# Patient Record
Sex: MALE | Race: WHITE | NOT HISPANIC OR LATINO | Employment: FULL TIME | URBAN - METROPOLITAN AREA
[De-identification: names, ages, dates, MRNs, and addresses within clinical notes are randomized per-mention and may not be internally consistent; named-entity substitution may affect disease eponyms.]

---

## 2020-06-20 ENCOUNTER — HOSPITAL ENCOUNTER (OUTPATIENT)
Dept: RADIOLOGY | Facility: HOSPITAL | Age: 35
Discharge: HOME | End: 2020-06-20

## 2020-06-20 ENCOUNTER — OFFICE VISIT - HEALTHEAST (OUTPATIENT)
Dept: FAMILY MEDICINE | Facility: CLINIC | Age: 35
End: 2020-06-20

## 2020-06-20 DIAGNOSIS — M25.531 RIGHT WRIST PAIN: ICD-10-CM

## 2020-06-20 DIAGNOSIS — M65.4 DE QUERVAIN'S DISEASE (TENOSYNOVITIS): ICD-10-CM

## 2020-07-07 ENCOUNTER — OFFICE VISIT - HEALTHEAST (OUTPATIENT)
Dept: INTERNAL MEDICINE | Facility: CLINIC | Age: 35
End: 2020-07-07

## 2020-07-07 DIAGNOSIS — M65.4 DE QUERVAIN'S DISEASE (TENOSYNOVITIS): ICD-10-CM

## 2020-07-07 DIAGNOSIS — M25.531 RIGHT WRIST PAIN: ICD-10-CM

## 2020-07-16 ENCOUNTER — COMMUNICATION - HEALTHEAST (OUTPATIENT)
Dept: INTERNAL MEDICINE | Facility: CLINIC | Age: 35
End: 2020-07-16

## 2021-02-26 ENCOUNTER — OFFICE VISIT - HEALTHEAST (OUTPATIENT)
Dept: FAMILY MEDICINE | Facility: CLINIC | Age: 36
End: 2021-02-26

## 2021-02-26 DIAGNOSIS — M54.50 ACUTE RIGHT-SIDED LOW BACK PAIN WITHOUT SCIATICA: ICD-10-CM

## 2021-03-04 ENCOUNTER — OFFICE VISIT - HEALTHEAST (OUTPATIENT)
Dept: INTERNAL MEDICINE | Facility: CLINIC | Age: 36
End: 2021-03-04

## 2021-03-04 DIAGNOSIS — M54.41 ACUTE BILATERAL LOW BACK PAIN WITH RIGHT-SIDED SCIATICA: ICD-10-CM

## 2021-03-04 ASSESSMENT — MIFFLIN-ST. JEOR: SCORE: 1705.57

## 2021-03-17 ENCOUNTER — COMMUNICATION - HEALTHEAST (OUTPATIENT)
Dept: INTERNAL MEDICINE | Facility: CLINIC | Age: 36
End: 2021-03-17

## 2021-06-04 VITALS
OXYGEN SATURATION: 98 % | HEART RATE: 62 BPM | SYSTOLIC BLOOD PRESSURE: 96 MMHG | RESPIRATION RATE: 14 BRPM | TEMPERATURE: 98.3 F | WEIGHT: 171 LBS | DIASTOLIC BLOOD PRESSURE: 64 MMHG

## 2021-06-04 VITALS
SYSTOLIC BLOOD PRESSURE: 109 MMHG | OXYGEN SATURATION: 98 % | DIASTOLIC BLOOD PRESSURE: 71 MMHG | RESPIRATION RATE: 16 BRPM | WEIGHT: 169 LBS | HEART RATE: 83 BPM

## 2021-06-05 VITALS
HEART RATE: 66 BPM | BODY MASS INDEX: 28.09 KG/M2 | WEIGHT: 179 LBS | HEIGHT: 67 IN | DIASTOLIC BLOOD PRESSURE: 64 MMHG | TEMPERATURE: 96.6 F | OXYGEN SATURATION: 100 % | RESPIRATION RATE: 22 BRPM | SYSTOLIC BLOOD PRESSURE: 110 MMHG

## 2021-06-05 VITALS
SYSTOLIC BLOOD PRESSURE: 121 MMHG | OXYGEN SATURATION: 99 % | DIASTOLIC BLOOD PRESSURE: 68 MMHG | TEMPERATURE: 97.6 F | WEIGHT: 178.1 LBS | RESPIRATION RATE: 12 BRPM | HEART RATE: 57 BPM

## 2021-06-09 NOTE — TELEPHONE ENCOUNTER
Who is calling:  Karime with [employer]  Reason for Call:    Karime is questioning who the patient was referred to in Orthopedics.  Please reach out to Karime and juan daniel.  Date of last appointment with primary care: 7/7/2020  Okay to leave a detailed message: Yes

## 2021-06-09 NOTE — PROGRESS NOTES
Internal Medicine Office Visit  [facility]  Patient Name: Damien Canela  Patient Age: 34 y.o.  YOB: 1985  MRN: [id number]    Date of Visit: 2020  Reason for Office Visit:   Chief Complaint   Patient presents with     Follow-up     thumb injury  was seen in [facility]           Assessment / Plan / Medical Decision Makin. De Quervain's disease (tenosynovitis)  2. Right wrist pain  - Ambulatory referral to Orthopedic Surgery  Recommend continued use of the splint, discouraged with activity just exacerbate his symptoms.  And be referred to orthopedics for further management possible steroid injection.  All patient questions rest verbalized understanding and agreed with plan.    Orders Placed This Encounter   Procedures     Ambulatory referral to Orthopedic Surgery   Followup: Return in about 1 week (around 2020). earlier if needed.    Health Maintenance Review  Health Maintenance   Topic Date Due     PREVENTIVE CARE VISIT  1985     HIV SCREENING  2000     TD 18+ HE  2003     TDAP ADULT ONE TIME DOSE  2003     ADVANCE CARE PLANNING  2003     INFLUENZA VACCINE RULE BASED (1) 2020         Damien Canela does not currently have medications on file.      HPI:  Damien Canela is a 34 y.o. year old who presents to the office today Patient presents clinic today for follow-up of right wrist injury.  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  He was seen in walk-in care was diagnosed as de Quervain's tenosynovitis advised to utilize the thumb spica splint, NSAIDs, ice and prescription for doing activities which exacerbate his symptoms.  Patient reports some improvement he stopped taking the ibuprofen and Tylenol as the pain is no longer throbbing though has  continued difficulty with grasping or opening his hand fully.        Review of Systems- pertinent positive in bold:  Constitutional: Fever, chills, night sweats, fainting, weight change, fatigue, dizziness, sleeping difficulties, loud snoring/pauses in breathing  Eyes: change in vision, blurred or double vision, redness/eye pain  Ears, nose, mouth, throat: change in hearing, ear pain, hoarseness, difficulty swallowing, sores in the mouth or throat  Respiratory: shortness of breath, cough, bloody sputum, wheezing  Cardiovascular: chest pain, palpitations   Gastrointestinal: abdominal pain, heartburn/indigestion, nausea/vomiting, change in appetite, change in bowel habits, constipation or diarrhea, rectal bleeding/dark stools, difficulty swallowing  Urinary: painful urination, frequent urination, urinary urgency/incontinence, blood in urine/dark urine, nocturia (new or increasing), muscle cramps, swelling of hands, feet, ankles, leg pain/redness  Skin: change in moles/freckles, rash, nodules  Hematologic/lymphatic: swollen lymph glands, abnormal bruising/bleeding  Endocrine: excessive thirst/urination, cold or heat intolerance  Neurologic/emotional: worrisome memory change, numbness/tingling, anxiety, mood swings      Current Scheduled Meds:  No outpatient encounter medications on file as of 7/7/2020.     No facility-administered encounter medications on file as of 7/7/2020.      No past medical history on file.  No past surgical history on file.  Social History     Tobacco Use     Smoking status: Never Smoker     Smokeless tobacco: Never Used   Substance Use Topics     Alcohol use: Not on file     Drug use: Not on file     No family history on file.  Social History     Social History Narrative     Not on file       Objective / Physical Examination:  Vitals:    07/07/20 1030   BP: 96/64   Patient Site: Right Arm   Patient Position: Sitting   Cuff Size: Adult Regular   Pulse: 62   Resp: 14   Temp: 98.3  F (36.8  C)    TempSrc: Oral   SpO2: 98%   Weight: 171 lb (77.6 kg)     Wt Readings from Last 3 Encounters:   07/07/20 171 lb (77.6 kg)   06/20/20 169 lb (76.7 kg)     There is no height or weight on file to calculate BMI.     General Appearance: Alert and oriented, cooperative, affect appropriate, speech clear, in no apparent distress  Head: Normocephalic, atraumatic  Eyes:  Conjunctivae clear and sclerae non-icteric  Lungs:  Normal inspiratory and expiratory effort  Abdomen: Bowel sounds active all four quadrants. Soft, non-tender. No hepatomegaly or splenomegaly.   Extremities: Pulses 2+ and equal throughout. No edema.  Patient has slight reduction in hand grasp right.  Patient noted to have tenderness at the base of the right thumb.  Integumentary: Warm and dry.   Neuro: Alert and oriented, follows commands appropriately.     Xr Wrist Right 3 Or More Vws    Result Date: 6/20/2020  EXAM: XR WRIST RIGHT 3 OR MORE VWS LOCATION: [facility] DATE/TIME: 6/20/2020 9:25 AM INDICATION: Pain below thumb near wrist, sudden, severe COMPARISON: None.     Negative right wrist. Normal joint spaces and alignment. No fracture.    No results found for this or any previous visit (from the past 240 hour(s)).        Leena Oakley, CNP

## 2021-06-15 NOTE — PATIENT INSTRUCTIONS - HE
Naprosyn 500 mg two times per day for 10 days.  Take with food.     Tylenol 500 mg three times per day as needed.    Heat and/or ice to area of pain three times per day.    Return to work on March 4, 2021 or sooner as able.    Follow up in clinic if not improving in one week.

## 2021-06-15 NOTE — PROGRESS NOTES
Chief Complaint   Patient presents with     Work Related Injury     back pain, injured today at 0900, lower Rt side buttock and pain travels down leg, sharp pinched pain       HPI:  Damien Canela is a 35 y.o. male who presents today complaining of r low back pain    History obtained from the patient.    Problem List:  There are no relevant problems documented for this patient.      No past medical history on file.    Social History     Tobacco Use     Smoking status: Never Smoker     Smokeless tobacco: Never Used   Substance Use Topics     Alcohol use: Not on file       Review of Systems   Constitutional: Negative.    HENT: Negative.    Eyes: Negative.    Respiratory: Negative.    Cardiovascular: Negative.    Gastrointestinal: Negative.    Endocrine: Negative.    Genitourinary: Negative.    Musculoskeletal: Positive for back pain. Negative for arthralgias, gait problem, joint swelling, myalgias, neck pain and neck stiffness.   Skin: Negative.    Allergic/Immunologic: Negative.    Neurological: Negative.    Hematological: Negative.    Psychiatric/Behavioral: Negative.        Vitals:    02/26/21 1110   BP: 121/68   Pulse: (!) 57   Resp: 12   Temp: 97.6  F (36.4  C)   TempSrc: Oral   SpO2: 99%   Weight: 178 lb 1.6 oz (80.8 kg)       Physical Exam  Constitutional:       Appearance: Normal appearance. He is normal weight.   HENT:      Head: Normocephalic and atraumatic.      Right Ear: Tympanic membrane, ear canal and external ear normal.      Left Ear: Tympanic membrane, ear canal and external ear normal.      Nose: Nose normal.      Mouth/Throat:      Mouth: Mucous membranes are dry.      Pharynx: Oropharynx is clear.   Eyes:      Extraocular Movements: Extraocular movements intact.      Conjunctiva/sclera: Conjunctivae normal.      Pupils: Pupils are equal, round, and reactive to light.   Neck:      Musculoskeletal: Normal range of motion and neck supple.   Cardiovascular:      Rate and Rhythm: Normal rate and regular  rhythm.      Pulses: Normal pulses.      Heart sounds: Normal heart sounds.   Pulmonary:      Effort: Pulmonary effort is normal.      Breath sounds: Normal breath sounds.   Abdominal:      General: Abdomen is flat. Bowel sounds are normal.      Palpations: Abdomen is soft.   Musculoskeletal:         General: Tenderness present. No swelling, deformity or signs of injury.      Right lower leg: No edema.      Left lower leg: No edema.      Comments: LOW BACK - neg slr bilaterally, nl strength, sensation and reflexes in the bilateral lower extremities.  No ttp trochanteric bursa.  Ttp r low back paraspinal muscles and r buttock.  No rash.   Normal gait. No pelvic tilt.  Normal range of motion of back.  Discomfort in r low back and r buttock on extending and flexing knee and hip.  Nl internal and external rotation of hips.   Skin:     General: Skin is warm and dry.      Capillary Refill: Capillary refill takes less than 2 seconds.   Neurological:      General: No focal deficit present.      Mental Status: He is alert. Mental status is at baseline. He is disoriented.      Cranial Nerves: No cranial nerve deficit.      Sensory: No sensory deficit.      Motor: No weakness.      Coordination: Coordination normal.      Gait: Gait normal.      Deep Tendon Reflexes: Reflexes normal.   Psychiatric:         Mood and Affect: Mood normal.         Behavior: Behavior normal.         Thought Content: Thought content normal.         Judgment: Judgment normal.         No notes on file      At the end of the encounter, I discussed results, diagnosis, medications. Discussed red flags for immediate return to clinic/ER, as well as indications for follow up if no improvement. Patient understood and agreed to plan. Patient was stable for discharge.    1. Acute right-sided low back pain without sciatica  naproxen (NAPROSYN) 500 MG tablet       C/w musculoskeletal low back pain.  Rcommendations below.    Patient Instructions   Naprosyn 500 mg  two times per day for 10 days.  Take with food.     Tylenol 500 mg three times per day as needed.    Heat and/or ice to area of pain three times per day.    Return to work on March 4, 2021 or sooner as able.    Follow up in clinic if not improving in one week.

## 2021-06-17 NOTE — TELEPHONE ENCOUNTER
Telephone Encounter by Moraima Calero CMA at 3/17/2021 11:06 AM     Author: Moraima Calero CMA Service: -- Author Type: Certified Medical Assistant    Filed: 3/17/2021 11:18 AM Encounter Date: 3/17/2021 Status: Signed    : Moraima Calero CMA (Certified Medical Assistant)       Leena Oakley CNP [facility] [place] 5 minutes ago (11:00 AM)     Letter sent to patient via [facility] text

## 2021-06-18 NOTE — PATIENT INSTRUCTIONS - HE
Patient Instructions by Eloisa Wood CNP at 6/20/2020  8:30 AM     Author: Eloisa Wood CNP Service: -- Author Type: Nurse Practitioner    Filed: 6/20/2020  9:00 AM Encounter Date: 6/20/2020 Status: Addendum    : Eloisa Wood CNP (Nurse Practitioner)    Related Notes: Original Note by Eloisa Wood CNP (Nurse Practitioner) filed at 6/20/2020  8:59 AM       ICE 20 minutes 6 times next 2-3 days   Ibuprofen 600 mg 4 times daily.    Brace     If you're not doing much better after 7-14 days, please go to, for instance, occupational health or walk-in orthopedics ([facility]) and request a DeQuervomar's Tenosynovitis steroid injection.      No gripping with left hand - work note.     Patient Education     De Quervain Tenosynovitis    De Quervain tenosynovitis is inflammation of tendons and synovium on the thumb side of the wrist. Tendons are fibers that attach muscle to bone. Synovium is a slick membrane that helps tendons move. Movements done over and over can irritate and inflame these tissues. This can cause pain when you touch or grasp objects, turn or twist your wrist, or make a fist. You may also have pain and swelling near the base of the thumb or numbness along the back of your thumb and index finger. You may also feel the thumb catch or snap when you move it.  Treatment will depend on how bad the pain is. It can often be treated with medicines, injections, splinting, and home care. If your case is severe, you may be referred to a specialist to talk about surgery.  Home care  Your healthcare provider may prescribe medicines to relieve pain and reduce inflammation. A steroid medicine may be injected near the tendons. This reduces swelling. The healthcare provider may also suggest taking over-the-counter medicines like ibuprofen or naproxen. These help reduce inflammation. Take all medicines only as directed.  The following are general care guidelines:    Avoid repetitive movements of  your wrist and thumb.    Note any activity that causes pain or swelling. If possible, avoid or limit that activity.    Put a cold pack on your thumb. You can make your own cold pack by wrapping a plastic bag of ice or bag of frozen vegetables in a thin towel. Hold this to your thumb for up to 20 minutes at a time. Don't put ice directly on the skin.    Your healthcare provider may put a splint on the thumb to hold it still. Use the splint as you have been instructed. In some cases, you may need to use a splint 24 hours a day for 4 to 6 weeks. This will allow the wrist and thumb to heal.  Follow-up care  Follow up with your healthcare provider, or as advised. You may need more treatment if your injury is severe or if your symptoms don't get better. This additional treatment may include local injections, physical therapy, and surgery.  When to seek medical advice  Call your healthcare provider right away if any of these occur:    Increase in pain or swelling    If you have fever, chills, redness, warmth, or drainage    Symptoms get worse after taking medicine    Pain spreads farther down the thumb or into the forearm    Pain continues to get in the way of daily life  Date Last Reviewed: 1/18/2016 2000-2017 [employer]. [address] [place] [address], [place] [address]. All rights reserved. This information is not intended as a substitute for professional medical care. Always follow your healthcare professional's instructions.           Patient Education     Treating De Quervain Tenosynovitis     The surgery releases the protective sheath, creating more space for the tendons. This allows them to move more freely and relieves inflammation.     The goal of your treatment is to ease your pain and allow you to use your thumb again. Treatment will depend on how bad the pain is.  Nonsurgical Treatment  Just taking a break from the activities that caused your pain may be enough. Your healthcare provider may also have  you take nonsteroidal, anti-inflammatory medicine (NSAIDs), such as ibuprofen. Or you may wear a splint for a few weeks to rest the thumb and wrist. To lesson swelling, your healthcare provider may inject an anti-inflammatory medicine, such as cortisone, around the tendons. You may have more pain at first. But in a few days your thumb should feel better.  Surgery  If other kinds of treatment dont ease your pain, or if the pain is bad, you may need surgery. The sheath that surrounds the tendons is released so the tendons can move more easily. This helps reduce the inflammation. It also allows you to straighten your thumb without pain. Surgery is done with local or regional anesthetic on an outpatient basis. So you can go home the same day. You will likely have a splint or dressing on your wrist for a few days while the tissue heals. You may need physical therapy to help strengthen muscles. Your healthcare provider will talk with you about the risks and possible complications of surgery.  Date Last Reviewed: 1/1/2018 2000-2019 [employer]. [address] [place] [address]. All rights reserved. This information is not intended as a substitute for professional medical care. Always follow your healthcare professional's instructions.

## 2021-06-18 NOTE — PATIENT INSTRUCTIONS - HE
Patient Instructions by Leena Oakley CNP at 3/4/2021  8:15 AM     Author: Leena Oakley CNP Service: -- Author Type: Nurse Practitioner    Filed: 3/4/2021  8:14 AM Encounter Date: 3/4/2021 Status: Signed    : Leena Oakley CNP (Nurse Practitioner)         Patient Education     Exercises to Strengthen Your Lower Back  Strong lower back and abdominal muscles work together to support your spine. The exercises below will help strengthen the lower back. It is important that you begin exercising slowly and increase levels gradually.  Always begin any exercise program with stretching. If you feel pain while doing any of these exercises, stop and talk to your doctor about a more specific exercise program that better suits your condition.   Low back stretch  The point of stretching is to make you more flexible and increase your range of motion. Stretch only as much as you are able. Stretch slowly. Do not push your stretch to the limit. If at any point you feel pain while stretching, this is your (temporary) limit.    Lie on your back with your knees bent and both feet on the ground.    Slowly raise your left knee to your chest as you flatten your lower back against the floor. Hold for 5 seconds.    Relax and repeat the exercise with your right knee.    Do 10 of these exercises for each leg.    Repeat hugging both knees to your chest at the same time.  Building lower back strength  Start your exercise routine with 10 to 30 minutes a day, 1 to 3 times a day.  Initial exercises  Lying on your back:  1. Ankle pumps: Move your foot up and down, towards your head, and then away. Repeat 10 times with each foot.  2. Heel slides: Slowly bend your knee, drawing the heel of your foot towards you. Then slide your heel/foot from you, straightening your knee. Do not lift your foot off the floor (this is not a leg lift).  3. Abdominal contraction: Bend your knees and put your hands on your stomach.  Tighten your stomach muscles. Hold for 5 seconds, then relax. Repeat 10 times.  4. Straight leg raise: Bend one leg at the knee and keep the other leg straight. Tighten your stomach muscles. Slowly lift your straight leg 6 to 12 inches off the floor and hold for up to 5 seconds. Repeat 10 times on each side.  Standin. Wall squats: Stand with your back against the wall. Move your feet about 12 inches away from the wall. Tighten your stomach muscles, and slowly bend your knees until they are at about a 45 degree angle. Do not go down too far. Hold about 5 seconds. Then slowly return to your starting position. Repeat 10 times.  2. Heel raises: Stand facing the wall. Slowly raise the heels of your feet up and down, while keeping your toes on the floor. If you have trouble balancing, you can touch the wall with your hands. Repeat 10 times.  More advanced exercises  When you feel comfortable enough, try these exercises.  1. Kneeling lumbar extension: Begin on your hands and knees. At the same time, raise and straighten your right arm and left leg until they are parallel to the ground. Hold for 2 seconds and come back slowly to a starting position. Repeat with left arm and right leg, alternating 10 times.  2. Prone lumbar extension: Lie face down, arms extended overhead, palms on the floor. At the same time, raise your right arm and left leg as high as comfortably possible. Hold for 10 seconds and slowly return to start. Repeat with left arm and right leg, alternating 10 times. Gradually build up to 20 times. (Advanced: Repeat this exercise raising both arms and both legs a few inches off the floor at the same time. Hold for 5 seconds and release.)  3. Pelvic tilt: Lie on the floor on your back with your knees bent at 90 degrees. Your feet should be flat on the floor. Inhale, exhale, then slowly contract your abdominal muscles bringing your navel toward your spine. Let your pelvis rock back until your lower back is  flat on the floor. Hold for 10 seconds while breathing smoothly.  4. Abdominal crunch: Perform a pelvic tilt (above) flattening your lower back against the floor. Holding the tension in your abdominal muscles, take another breath and raise your shoulder blades off the ground (this is not a full sit-up). Keep your head in line with your body (dont bend your neck forward). Hold for 2 seconds, then slowly lower.  Date Last Reviewed: 6/1/2016 2000-2017 [employer]. [address] [place] [id number]. All rights reserved. This information is not intended as a substitute for professional medical care. Always follow your healthcare professional's instructions.           Patient Education     Back Pain (Acute or Chronic)    Back pain is one of the most common problems. The good news is that most people feel better in 1 to 2 weeks, and most of the rest in 1 to 2 months. Most people can remain active.  People who have pain describe it differently--not everyone is the same.    The pain can be sharp, stabbing, shooting, aching, cramping or burning.    Movement, standing, bending, lifting, sitting, or walking may worsen pain.    It can be localized to one spot or area, or it can be more generalized.    It can spread or radiate upwards, to the front, or go down your arms or legs (sciatica).    It can cause muscle spasm.  Most of the time, mechanical problems with the muscles or spine cause the pain. Mechanical problems are usually caused by an injury to the muscles or ligaments. While illness can cause back pain, it is usually not caused by a serious illness. Mechanical problems include:     Physical activity such as sports, exercise, work, or normal activity    Overexertion, lifting, pushing, pulling incorrectly or too aggressively    Sudden twisting, bending, or stretching from an accident, or accidental movement    Poor posture    Stretching or moving wrong, without noticing pain at the time    Poor  coordination, lack of regular exercise (check with your doctor about this)    Spinal disc disease or arthritis    Stress  Pain can also be related to pregnancy, or illness like appendicitis, bladder or kidney infections, pelvic infections, and many other things.  Acute back pain usually gets better in 1 to 2 weeks. Back pain related to disk disease, arthritis in the spinal joints or spinal stenosis (narrowing of the spinal canal) can become chronic and last for months or years.  Unless you had a physical injury (for example, a car accident or fall) X-rays are usually not needed for the initial evaluation of back pain. If pain continues and does not respond to medical treatment, X-rays and other tests may be needed.  Home care  Try these home care recommendations:    When in bed, try to find a position of comfort. A firm mattress is best. Try lying flat on your back with pillows under your knees. You can also try lying on your side with your knees bent up towards your chest and a pillow between your knees.    At first, do not try to stretch out the sore spots. If there is a strain, it is not like the good soreness you get after exercising without an injury. In this case, stretching may make it worse.    Don't sit for long periods, as in a long car ride or during other travel. This puts more stress on the lower back than standing or walking.    During the first 24 to 72 hours after an acute injury or flare up of chronic back pain, apply an ice pack to the painful area for 20 minutes and then remove it for 20 minutes. Do this over a period of 60 to 90 minutes or several times a day. This will reduce swelling and pain. Wrap the ice pack in a thin towel or plastic to protect your skin.    You can start with ice, then switch to heat. Heat (hot shower, hot bath, or heating pad) reduces pain and works well for muscle spasms. Heat can be applied to the painful area for 20 minutes then remove it for 20 minutes. Do this over a  period of 60 to 90 minutes or several times a day. Do not sleep on a heating pad. It can lead to skin burns or tissue damage.    You can alternate ice and heat therapy. Talk with your doctor about the best treatment for your back pain.    Therapeutic massage can help relax the back muscles without stretching them.    Be aware of safe lifting methods and do not lift anything without stretching first.  Medicines  Talk to your doctor before using medicine, especially if you have other medical problems or are taking other medicines.    You may use over-the-counter medicine as directed on the bottle to control pain, unless another pain medicine was prescribed. If you have chronic conditions like diabetes, liver or kidney disease, stomach ulcers, or gastrointestinal bleeding, or are taking blood thinners, talk to your doctor before taking any medicine.    Be careful if you are given a prescription medicines, narcotics, or medicine for muscle spasms. They can cause drowsiness, affect your coordination, reflexes, and judgement. Do not drive or operate heavy machinery.  Follow-up care  Follow up with your healthcare provider, or as advised.   A radiologist will review any X-rays that were taken. Your provide will notify you of any new findings that may affect your care.  Call 911  Call 911 if any of the following occur:    Trouble breathing    Confusion    Very drowsy or trouble awakening    Fainting or loss of consciousness    Rapid or very slow heart rate    Loss of bowel or bladder control  When to seek medical advice  Call your healthcare provider right away if any of these occur:     Pain becomes worse or spreads to your legs    Weakness or numbness in one or both legs    Numbness in the groin or genital area  Date Last Reviewed: 7/1/2016 2000-2017 [employer]. [address] [place] [address], [place] [address]. All rights reserved. This information is not intended as a substitute for professional medical care.  Always follow your healthcare professional's instructions.           Patient Education     Causes of Lumbar (Low Back) Pain  Low back pain can be caused by problems with any part of the lumbar spine. A disk can herniate (push out) and press on a nerve. Vertebrae can rub against each other or slip out of place. This can irritate facet joints and nerves. It can also lead to stenosis, a narrowing of the spinal canal or foramen.  Pressure from a disk  Constant wear and tear on a disk can cause it to weaken and push outward. Part of the disk may then press on nearby nerves. There are two common types of herniated disks:  Contained means the soft nucleus is protruding outward.   Extruded means the firm annulus has torn, letting the soft center squeeze through.     Pressure from bone  An unstable spine   With age, a disk may thin and wear out. Vertebrae above and below the disk may begin to touch. This can put pressure on nerves. It can also cause bone spurs (growths) to form where the bones rub together.    Stenosis results when bone spurs narrow the foramen or spinal canal. This also puts pressure on nerves. Slipping vertebrae can irritate nerves and joints. They can also worsen stenosis.    In some cases, vertebrae become unstable and slip forward. This is called spondylolisthesis.     Date Last Reviewed: 10/12/2015    7487-1352 [employer]. [address] [place] [address], [place] [address]. All rights reserved. This information is not intended as a substitute for professional medical care. Always follow your healthcare professional's instructions.           Patient Education     Relieving Back Pain  Back pain is a common problem. You can strain back muscles by lifting too much weight or just by moving the wrong way. Back strain can be uncomfortable, even painful. And it can take weeks or months to improve. To help yourself feel better and prevent future back strains, try these tips.  Important: Don't give aspirin  to children or teens without first discussing it with your child's healthcare provider.  Ice    Ice reduces muscle pain and swelling. It helps most during the first 24 to 48 hours after an injury.    Wrap an ice pack or a bag of frozen peas in a thin towel. Never put ice directly on your skin.    Place the ice where your back hurts the most.    Dont ice for more than 20 minutes at a time.    You can use ice several times a day.  Medicines  Over-the-counter pain relievers include acetaminophen and anti-inflammatory medicines, which includes aspirin, naproxen, or ibuprofen. They can help ease discomfort. Some also reduce swelling.    Tell your healthcare provider about any medicines you are already taking.    Take medicines only as directed.  Manipulation and massage  Having manipulation by an osteopathic doctor or chiropractor may be helpful. Getting a massage also may help.   Heat  After the first 48 hours, heat can relax sore muscles and improve blood flow.    Try a warm bath or shower. Or use a heating pad set on low. To prevent a burn, keep a cloth between you and the heating pad.    Dont use a heating pad for more than 15 minutes at a time. Never sleep on a heating pad.  Date Last Reviewed: 6/1/2018 2000-2019 [employer] [address] [place] [id number]. All rights reserved. This information is not intended as a substitute for professional medical care. Always follow your healthcare professional's instructions.           Patient Education     Step-by-Step  Safe Lifting    Date Last Reviewed: 7/9/2015 2000-2019 [employer] [address] [place] [id number]. All rights reserved. This information is not intended as a substitute for professional medical care. Always follow your healthcare professional's instructions.           Patient Education     Understanding Lumbar Radiculopathy    Lumbar radiculopathy is irritation or inflammation of a nerve root in the low  back. It causes symptoms that spread out from the back down one or both legs. To understand this condition, it helps to understand the parts of the spine:    Vertebrae. These are bones that stack to form the spine. The lumbar spine contains the 5 bottom vertebrae.    Disks. These are soft pads of tissue between the vertebrae. They act as shock absorbers for the spine.    Spinal canal. This is a tunnel formed within the stacked vertebrae. In the lumbar spine, nerves run through this canal.    Nerves. These branch off and leave the spinal canal, traveling out to parts of the body. As they leave the spinal canal, nerves pass through openings between the vertebrae. The nerve root is the part of the nerve that is closest to the spinal canal.    Sciatic nerve. This is a large nerve formed from several nerve roots in the low back. This nerve extends down the back of the leg to the foot.  With lumbar radiculopathy, nerve roots in the low back become irritated. This leads to pain and symptoms. The sciatic nerve is commonly involved, so the condition is often called sciatica.  What causes lumbar radiculopathy?  Aging, injury, poor posture, extra body weight, and other issues can lead to problems in the low back. These problems may then irritate nerve roots. They include:    Damage to a disk in the lumbar spine. The damaged disk may then press on nearby nerve roots.    Degeneration from wear and tear, and aging. This can lead to narrowing (stenosis) of the openings between the vertebrae. The narrowed openings press on nerve roots as they leave the spinal canal.    Unstable spine. This is when a vertebra slips forward. It can then press on a nerve root.  Other, less common things can put pressure on nerves in the low back. These include diabetes, infection, or a tumor.  Symptoms of lumbar radiculopathy  These include:    Pain in the low back    Pain, numbness, tingling, or weakness that travels into the buttocks, hip, groin, or  leg    Muscle spasms  Treatment for lumbar radiculopathy  In most cases, your healthcare provider will first try treatments that help relieve symptoms. These may include:    Prescription and over-the-counter pain medicines. These help relieve pain, swelling, and irritation.    Limits on positions and activities that increase pain. But lying in bed or avoiding all movement is only recommended for a short period of time.    Physical therapy, including exercises and stretches. This helps decrease pain and increase movement and function.    Steroid shots into the lower back. This may help relieve symptoms for a time.    Weight-loss program. If you are overweight, losing extra pounds may help relieve symptoms.  In some cases, you may need surgery to fix the underlying problem. This depends on the cause, the symptoms, and how long the pain has lasted.  Possible complications  Over time, an irritated and inflamed nerve may become damaged. This may lead to long-lasting (permanent) numbness or weakness in your legs and feet. If symptoms change suddenly or get worse, be sure to let your healthcare provider know.  When to call your healthcare provider  Call your healthcare provider right away if you have any of these:    New pain or pain that gets worse    New or increasing weakness, tingling, or numbness in your leg or foot    Problems controlling your bladder or bowel   Date Last Reviewed: 3/10/2016    [id number] [employer]. [address] [place] [address], [place] [address]. All rights reserved. This information is not intended as a substitute for professional medical care. Always follow your healthcare professional's instructions.

## 2021-06-20 NOTE — LETTER
Letter by Eloisa Wood CNP at      Author: Eloisa Wood CNP Service: -- Author Type: --    Filed:  Encounter Date: 6/20/2020 Status: (Other)         June 20, 2020     Patient: Damien Canela   YOB: 1985   Date of Visit: 6/20/2020       To Whom It May Concern:    It is my medical opinion that Damien Canela may return to light duty immediately with the following restrictions: no gripping of items with right hand. Must wear brace until rechecked.  .    If you have any questions or concerns, please don't hesitate to call.    Sincerely,        Electronically signed by Eloisa Wood CNP

## 2021-06-20 NOTE — LETTER
Letter by Leena Oakley CNP at      Author: Leena Oakley CNP Service: -- Author Type: --    Filed:  Encounter Date: 7/7/2020 Status: (Other)         July 7, 2020     Patient: Damien Canela   YOB: 1985   Date of Visit: 7/7/2020       To Whom It May Concern:    It is my medical opinion that Damien Canela may return to light duty immediately with the following restrictions: no grasping with right hand. Continue to wear wrist splint until follow up with specialist..    If you have any questions or concerns, please don't hesitate to call.    Sincerely,        Electronically signed by Leena Oakley CNP

## 2021-06-21 NOTE — LETTER
Letter by Moraima Calero CMA at      Author: Moraima Calero CMA Service: -- Author Type: --    Filed:  Encounter Date: 3/17/2021 Status: (Other)         March 17, 2021     Patient: Damien Canela   YOB: 1985   Date of Visit: 3/17/2021       To Whom It May Concern:    It is my medical opinion that Damien Canela may return to full duty immediately with no restrictions.    If you have any questions or concerns, please don't hesitate to call.    Sincerely,        Electronically signed by Generic Provider [employer]

## 2021-06-21 NOTE — LETTER
Letter by Leena Oakley CNP at      Author: Leena Oakley CNP Service: -- Author Type: --    Filed:  Encounter Date: 3/4/2021 Status: (Other)         March 4, 2021     Patient: Damien Canela   YOB: 1985   Date of Visit: 3/4/2021       To Whom It May Concern:    It is my medical opinion that Damien Canela may return to light duty immediately with the following restrictions: 10 pound lifting restriction until 3.14.2021 then may return to full duty without restriction. .    If you have any questions or concerns, please don't hesitate to call.    Sincerely,        Electronically signed by Leena Oakley CNP

## 2021-06-21 NOTE — LETTER
Letter by Catarino Aguiar MD at      Author: Catarino Aguiar MD Service: -- Author Type: --    Filed:  Encounter Date: 2/26/2021 Status: (Other)         February 26, 2021     Patient: Damien Canela   YOB: 1985   Date of Visit: 2/26/2021       To Whom it May Concern:    Damien Canela was seen in my clinic on 2/26/2021.  He should be off work until March 4, 2021 due to a low back injury sustained while working today, 2/26/21.  He may return sooner if able.  If you have any questions or concerns, please don't hesitate to call.    Sincerely,         Electronically signed by Catarino Aguiar Jr, MD

## 2021-06-21 NOTE — LETTER
Letter by Leena Oakley CNP at      Author: Leena Oakley CNP Service: -- Author Type: --    Filed:  Encounter Date: 3/17/2021 Status: (Other)         March 17, 2021     Patient: Damien Canela   YOB: 1985   Date of Visit: 3/17/2021       To Whom It May Concern:    It is my medical opinion that Damien Canela may return to full duty immediately with no restrictions.    If you have any questions or concerns, please don't hesitate to call.    Sincerely,        Electronically signed by Leena Oakley CNP

## 2021-06-21 NOTE — LETTER
Letter by Leena Oakley CNP at      Author: Leena Oakley CNP Service: -- Author Type: --    Filed:  Encounter Date: 3/4/2021 Status: (Other)         March 4, 2021     Patient: Damien Canela   YOB: 1985   Date of Visit: 3/4/2021       To Whom It May Concern:    It is my medical opinion that Damien Canela may return to light duty immediately with the following restrictions: 10 pound weight restriction for 7 days.  May return to full duty on 3.1.2021.    If you have any questions or concerns, please don't hesitate to call.    Sincerely,        Electronically signed by Leena Oakley CNP

## 2021-06-27 ENCOUNTER — HEALTH MAINTENANCE LETTER (OUTPATIENT)
Age: 36
End: 2021-06-27

## 2021-06-29 NOTE — PROGRESS NOTES
Progress Notes by Eloisa Wood CNP at 6/20/2020  8:30 AM     Author: Eloisa Wood CNP Service: -- Author Type: Nurse Practitioner    Filed: 6/20/2020  9:57 AM Encounter Date: 6/20/2020 Status: Signed    : Eloisa Wood CNP (Nurse Practitioner)       Chief Complaint   Patient presents with   ? Hand Injury     Happened this morning around 6:45am 6/20--- RT hand pain-- throbbing pain, swelling       ASSESSMENT & PLAN:   Diagnoses and all orders for this visit:    De Quervain's disease (tenosynovitis)  -     Wrist/Arm DME:    Right wrist pain  -     XR Wrist Right 3 or More VWS  -     ibuprofen tablet 600 mg (ADVIL,MOTRIN)        MDM:  As pain was sudden and severe with no prodrome, will check x-ray.  This was neg.     Location of pain most consistent with de Quervain's tenosynovitis.    NSAIDs, ice, thumb spica splint.  No gripping with right hand - work note. Recheck in 1 to 2 weeks, consider steroid injection if pain still severe.    Supportive care discussed.  See discharge instructions below for specific recommendations given.    At the end of the encounter, I discussed results, diagnosis, medications. Discussed red flags for immediate return to clinic/ER, as well as indications for follow up if no improvement. Patient and/or caregiver understood and agreed to plan. Patient was stable for discharge.    SUBJECTIVE    HPI:  HPI  Damien Caneal presents to the walk-in clinic with   Chief Complaint   Patient presents with   ? 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 Progressively worsened below base of the thumb over 2 hours.  No specific injury.  Has never had similar before.     Pain 6/10 without gripping and 10/10 with.   States he is more or less unable to  due to severity of pain.  Has had no pain medication.  Injury  occurred today approximately 2 and half hours ago.    Is left handed.      Patient states tendinitis is common in his position at [employer].    See ROS for additional symptoms and/or pertinent negatives.       History obtained from the patient.    No past medical history on file.    There are no active non-hospital problems to display for this patient.      No family history on file.    Social History     Tobacco Use   ? Smoking status: Never Smoker   ? Smokeless tobacco: Never Used   Substance Use Topics   ? Alcohol use: Not on file       Review of Systems   All other systems reviewed and are negative.      OBJECTIVE    Vitals:    06/20/20 0825   BP: 109/71   Patient Site: Right Arm   Patient Position: Sitting   Cuff Size: Adult Regular   Pulse: 83   Resp: 16   SpO2: 98%   Weight: 169 lb (76.7 kg)       Physical Exam  Constitutional:       General: He is not in acute distress.     Appearance: He is well-developed.   HENT:      Right Ear: External ear normal.      Left Ear: External ear normal.   Eyes:      General:         Right eye: No discharge.         Left eye: No discharge.      Conjunctiva/sclera: Conjunctivae normal.   Pulmonary:      Effort: Pulmonary effort is normal.   Musculoskeletal:         General: Tenderness (Along APL tendon between wrist and base of thumb) present. No swelling or deformity.   Skin:     General: Skin is warm and dry.      Capillary Refill: Capillary refill takes less than 2 seconds.   Neurological:      Mental Status: He is alert and oriented to person, place, and time.   Psychiatric:         Mood and Affect: Mood normal.         Behavior: Behavior normal.         Thought Content: Thought content normal.         Judgment: Judgment normal.         Labs:  No results found for this or any previous visit (from the past 240 hour(s)).      Radiology:    Xr Wrist Right 3 Or More Vws    Result Date: 6/20/2020  EXAM: XR WRIST RIGHT 3 OR MORE VWS LOCATION: [facility] DATE/TIME: 6/20/2020  9:25 AM INDICATION: Pain below thumb near wrist, sudden, severe COMPARISON: None.     Negative right wrist. Normal joint spaces and alignment. No fracture.      PATIENT INSTRUCTIONS:   Patient Instructions     ICE 20 minutes 6 times next 2-3 days   Ibuprofen 600 mg 4 times daily.    Brace     If you're not doing much better after 7-14 days, please go to, for instance, occupational health or walk-[facility]opedics ([facility] and request a DeQuervain's Tenosynovitis steroid injection.      No gripping with left hand - work note.     Patient Education     De Quervain Tenosynovitis    De Quervain tenosynovitis is inflammation of tendons and synovium on the thumb side of the wrist. Tendons are fibers that attach muscle to bone. Synovium is a slick membrane that helps tendons move. Movements done over and over can irritate and inflame these tissues. This can cause pain when you touch or grasp objects, turn or twist your wrist, or make a fist. You may also have pain and swelling near the base of the thumb or numbness along the back of your thumb and index finger. You may also feel the thumb catch or snap when you move it.  Treatment will depend on how bad the pain is. It can often be treated with medicines, injections, splinting, and home care. If your case is severe, you may be referred to a specialist to talk about surgery.  Home care  Your healthcare provider may prescribe medicines to relieve pain and reduce inflammation. A steroid medicine may be injected near the tendons. This reduces swelling. The healthcare provider may also suggest taking over-the-counter medicines like ibuprofen or naproxen. These help reduce inflammation. Take all medicines only as directed.  The following are general care guidelines:    Avoid repetitive movements of your wrist and thumb.    Note any activity that causes pain or swelling. If possible, avoid or limit that activity.    Put a cold pack on your thumb. You can make your own  cold pack by wrapping a plastic bag of ice or bag of frozen vegetables in a thin towel. Hold this to your thumb for up to 20 minutes at a time. Don't put ice directly on the skin.    Your healthcare provider may put a splint on the thumb to hold it still. Use the splint as you have been instructed. In some cases, you may need to use a splint 24 hours a day for 4 to 6 weeks. This will allow the wrist and thumb to heal.  Follow-up care  Follow up with your healthcare provider, or as advised. You may need more treatment if your injury is severe or if your symptoms don't get better. This additional treatment may include local injections, physical therapy, and surgery.  When to seek medical advice  Call your healthcare provider right away if any of these occur:    Increase in pain or swelling    If you have fever, chills, redness, warmth, or drainage    Symptoms get worse after taking medicine    Pain spreads farther down the thumb or into the forearm    Pain continues to get in the way of daily life  Date Last Reviewed: 1/18/2016 2000-2017 [employer]. [address] [place] [address]. All rights reserved. This information is not intended as a substitute for professional medical care. Always follow your healthcare professional's instructions.           Patient Education     Treating De Quervain Tenosynovitis     The surgery releases the protective sheath, creating more space for the tendons. This allows them to move more freely and relieves inflammation.     The goal of your treatment is to ease your pain and allow you to use your thumb again. Treatment will depend on how bad the pain is.  Nonsurgical Treatment  Just taking a break from the activities that caused your pain may be enough. Your healthcare provider may also have you take nonsteroidal, anti-inflammatory medicine (NSAIDs), such as ibuprofen. Or you may wear a splint for a few weeks to rest the thumb and wrist. To lesson swelling,  your healthcare provider may inject an anti-inflammatory medicine, such as cortisone, around the tendons. You may have more pain at first. But in a few days your thumb should feel better.  Surgery  If other kinds of treatment dont ease your pain, or if the pain is bad, you may need surgery. The sheath that surrounds the tendons is released so the tendons can move more easily. This helps reduce the inflammation. It also allows you to straighten your thumb without pain. Surgery is done with local or regional anesthetic on an outpatient basis. So you can go home the same day. You will likely have a splint or dressing on your wrist for a few days while the tissue heals. You may need physical therapy to help strengthen muscles. Your healthcare provider will talk with you about the risks and possible complications of surgery.  Date Last Reviewed: 1/1/2018 2000-2019 [employer]. [address] [place] [address]. All rights reserved. This information is not intended as a substitute for professional medical care. Always follow your healthcare professional's instructions.

## 2021-06-30 NOTE — PROGRESS NOTES
Progress Notes by Leena Oakley CNP at 3/4/2021  8:15 AM     Author: Leena Oakley CNP Service: -- Author Type: Nurse Practitioner    Filed: 3/4/2021  8:28 AM Encounter Date: 3/4/2021 Status: Signed    : Leena Oakley CNP (Nurse Practitioner)            [facility]  Patient Name: Damien Canela  Patient Age: 35 y.o.  YOB: 1985  MRN: [id number]    Date of Visit: 3/4/2021  Reason for Office Visit:   Chief Complaint   Patient presents with   ? Back Injury     Last friday was injured. is getting better. not fully comfortable going back to work full duty. Would be willing to go back light duty.            Assessment / Plan / Medical Decision Makin. Acute bilateral low back pain with right-sided sciatica  Patient continue to alternate between naproxen and Tylenol in addition to applying heat or ice at 20-minute increments he is provided exercises to complete at home.  He will be on restriction for 1 more week to not lift more than 10 pounds and then will return to work without restriction.    Patient advised if symptoms persist, worsen or new symptoms arise they are to seek medical care.  All patients questions addressed. Patient verbalized understanding and agreement with plan.     No orders of the defined types were placed in this encounter.  Followup: Return in about 1 week (around 3/11/2021). earlier if needed.            Health Maintenance Review  Health Maintenance   Topic Date Due   ? PREVENTIVE CARE VISIT  1985   ? ADVANCE CARE PLANNING  2003   ? LIPID  2020   ? INFLUENZA VACCINE RULE BASED (1) 2021 (Originally 2020)   ? TD 18+ HE  10/18/2027   ? TDAP ADULT ONE TIME DOSE  Completed   ? Pneumococcal Vaccine: Pediatrics (0 to 5 Years) and At-Risk Patients (6 to 64 Years)  Aged Out   ? HEPATITIS C SCREENING  Discontinued   ? HIV SCREENING  Discontinued   ? HEPATITIS B VACCINES  Discontinued         I am having Damien UGTIERREZ  Hilton maintain his naproxen.      HPI:  Damien Canela is a 35 y.o. year old who presents to the office today for follow-up of low back pain.    Patient was seen in walk-in care on 2/26/2021 due to a chief concern for right-sided low back pain.  Patient reports that he was crouching down and reaching forward when he developed right-sided low back pain with radicular symptoms down the posterior leg to posterior right knee.  He reports increased pain and discomfort with rotation or bending forward.  He was prescribed Naprosyn 500 twice daily for 10 days to take with food alternating with Tylenol heat or ice 3 times daily.  Patient reports mild relief though not total relief of symptoms.  He has been walking on the treadmill which she states is going well for mobility purposes.      Review of Systems- pertinent positive in bold:  Constitutional: Fever, chills, night sweats, fainting, weight change, fatigue, dizziness, sleeping difficulties, loud snoring/pauses in breathing  Eyes: change in vision, blurred or double vision, redness/eye pain  Ears, nose, mouth, throat: change in hearing, ear pain, hoarseness, difficulty swallowing, sores in the mouth or throat  Respiratory: shortness of breath, cough, bloody sputum, wheezing  Cardiovascular: chest pain, palpitations   Gastrointestinal: abdominal pain, heartburn/indigestion, nausea/vomiting, change in appetite, change in bowel habits, constipation or diarrhea, rectal bleeding/dark stools, difficulty swallowing  Urinary: painful urination, frequent urination, urinary urgency/incontinence, blood in urine/dark urine, nocturia (new or increasing), muscle cramps, swelling of hands, feet, ankles, leg pain/redness  Skin: change in moles/freckles, rash, nodules  Hematologic/lymphatic: swollen lymph glands, abnormal bruising/bleeding  Endocrine: excessive thirst/urination, cold or heat intolerance  Neurologic/emotional: worrisome memory change, numbness/tingling, anxiety, mood  "swings      Current Scheduled Meds:  Outpatient Encounter Medications as of 3/4/2021   Medication Sig Dispense Refill   ? naproxen (NAPROSYN) 500 MG tablet Take 1 tablet (500 mg total) by mouth 2 (two) times a day with meals for 10 days. 20 tablet 0     No facility-administered encounter medications on file as of 3/4/2021.      No past medical history on file.  No past surgical history on file.  Social History     Tobacco Use   ? Smoking status: Never Smoker   ? Smokeless tobacco: Never Used   Substance Use Topics   ? Alcohol use: Not on file   ? Drug use: Not on file     No family history on file.  Social History     Social History Narrative   ? Not on file       Objective / Physical Examination:  Vitals:    03/04/21 0803   BP: 110/64   Pulse: 66   Resp: 22   Temp: 96.6  F (35.9  C)   SpO2: 100%   Weight: 179 lb (81.2 kg)   Height: 5' 7\" (1.702 m)     Wt Readings from Last 3 Encounters:   03/04/21 179 lb (81.2 kg)   02/26/21 178 lb 1.6 oz (80.8 kg)   07/07/20 171 lb (77.6 kg)     Body mass index is 28.04 kg/m .     General Appearance: Alert and oriented, cooperative, affect appropriate, speech clear, in no apparent distress  Head: Normocephalic, atraumatic  Eyes:  Conjunctivae clear and sclerae non-icteric  Lungs: Normal inspiratory and expiratory effort  Back: Assist in seated position normal curvature and discomfort upon palpation of the lower lumbar spine into the right SI joint with a positive straight leg right.  Abdomen: Bowel sounds active all four quadrants. Soft, non-tender. No hepatomegaly or splenomegaly.   Extremities: Pulses 2+ and equal throughout. No edema. Strength equal throughout.  Integumentary: Warm and dry. Without suspicious looking lesions  Neuro: Alert and oriented, follows commands appropriately.     No results found.  No results found for this or any previous visit (from the past 240 hour(s)).  Diagnostics:     No results found for this or any previous visit.    Quality review:     No data " recorded    No data recorded      Leena Oakley, CNP  Internal Medicine  [address] [address]

## 2021-10-17 ENCOUNTER — HEALTH MAINTENANCE LETTER (OUTPATIENT)
Age: 36
End: 2021-10-17

## 2022-05-29 ENCOUNTER — OFFICE VISIT (OUTPATIENT)
Dept: URGENT CARE | Facility: URGENT CARE | Age: 37
End: 2022-05-29
Payer: COMMERCIAL

## 2022-05-29 VITALS
SYSTOLIC BLOOD PRESSURE: 119 MMHG | HEART RATE: 90 BPM | TEMPERATURE: 98.4 F | OXYGEN SATURATION: 95 % | DIASTOLIC BLOOD PRESSURE: 79 MMHG

## 2022-05-29 DIAGNOSIS — R07.0 THROAT PAIN: ICD-10-CM

## 2022-05-29 DIAGNOSIS — J03.90 TONSILLITIS: Primary | ICD-10-CM

## 2022-05-29 LAB
DEPRECATED S PYO AG THROAT QL EIA: NEGATIVE
GROUP A STREP BY PCR: NOT DETECTED

## 2022-05-29 PROCEDURE — 87651 STREP A DNA AMP PROBE: CPT | Performed by: PHYSICIAN ASSISTANT

## 2022-05-29 PROCEDURE — 99213 OFFICE O/P EST LOW 20 MIN: CPT | Performed by: PHYSICIAN ASSISTANT

## 2022-05-29 RX ORDER — CLINDAMYCIN HCL 300 MG
300 CAPSULE ORAL 3 TIMES DAILY
Qty: 30 CAPSULE | Refills: 0 | Status: SHIPPED | OUTPATIENT
Start: 2022-05-29 | End: 2022-06-08

## 2022-05-29 RX ORDER — PREDNISONE 20 MG/1
40 TABLET ORAL DAILY
Qty: 10 TABLET | Refills: 0 | Status: SHIPPED | OUTPATIENT
Start: 2022-05-29 | End: 2022-06-03

## 2022-05-29 NOTE — PROGRESS NOTES
Assessment & Plan     Tonsillitis  Ongoing symptoms for a couple weeks.  Patient is in no acute distress.  He is afebrile. No evidence of overt peritonsillar abscess on exam.  However, moderate posterior oropharynx erythema.  Right tonsil is larger than the left.  Given constellation of symptoms we have decided to treat for tonsillitis today.  Patient is allergic to amoxicillin.  Clindamycin is prescribed.  Prednisone is also prescribed.  We discussed red flag symptoms and when to seek emergent care.  Close monitoring of symptoms.  Follow-up if any worsening symptoms.  Patient agrees with the plan.  - clindamycin (CLEOCIN) 300 MG capsule  Dispense: 30 capsule; Refill: 0  - predniSONE (DELTASONE) 20 MG tablet  Dispense: 10 tablet; Refill: 0    Throat pain  Rapid strep is negative.  Culture is pending.  See treatment of tonsillitis above.  - Streptococcus A Rapid Screen w/Reflex to PCR - Clinic Collect  - Group A Streptococcus PCR Throat Swab         Return in about 1 week (around 6/5/2022) for Symptoms failing to improve.    Alvina Solomon PA-C  [facility] SAMPSON Quezada is a 36 year old male who presents to clinic today for the following health issues:  Chief Complaint   Patient presents with     Pharyngitis     ONSET 2 WEEKS AGO WITH NO IMPROVEMENT GETTING WORSE      HPI      Pharyngitis    Onset of symptoms was 2 week(s) ago.  Course of illness is worsening.    Severity moderate  Current and Associated symptoms: sore throat, mild cough  Denies fever/chills  Treatment measures tried include Tylenol/Ibuprofen.  Predisposing factors include None.        Review of Systems  Constitutional, HEENT, cardiovascular, pulmonary, GI, , musculoskeletal, neuro, skin, endocrine and psych systems are negative, except as otherwise noted.      Objective    /79   Pulse 90   Temp 98.4  F (36.9  C)   SpO2 95%   Physical Exam   GENERAL: healthy, alert and no distress  HENT: ear canals and  TM's normal, moderate posterior oropharynx erythema, tonsils 2+, no exudates, R tonsil larger than the left, uvula is midline  NECK: no adenopathy  RESP: lungs clear to auscultation - no rales, rhonchi or wheezes  CV: regular rate and rhythm, normal S1 S2  MS: no gross musculoskeletal defects noted, no edema    Results for orders placed or performed in visit on 05/29/22 (from the past 24 hour(s))   Streptococcus A Rapid Screen w/Reflex to PCR - Clinic Collect    Specimen: Throat; Swab   Result Value Ref Range    Group A Strep antigen Negative Negative

## 2022-07-24 ENCOUNTER — HEALTH MAINTENANCE LETTER (OUTPATIENT)
Age: 37
End: 2022-07-24

## 2022-10-02 ENCOUNTER — HEALTH MAINTENANCE LETTER (OUTPATIENT)
Age: 37
End: 2022-10-02

## 2023-03-02 ENCOUNTER — OFFICE VISIT (OUTPATIENT)
Dept: FAMILY MEDICINE | Facility: CLINIC | Age: 38
End: 2023-03-02
Payer: COMMERCIAL

## 2023-03-02 VITALS
WEIGHT: 206 LBS | BODY MASS INDEX: 32.33 KG/M2 | OXYGEN SATURATION: 97 % | HEIGHT: 67 IN | SYSTOLIC BLOOD PRESSURE: 110 MMHG | HEART RATE: 66 BPM | TEMPERATURE: 97.9 F | DIASTOLIC BLOOD PRESSURE: 71 MMHG | RESPIRATION RATE: 16 BRPM

## 2023-03-02 DIAGNOSIS — M25.511 ACUTE PAIN OF BOTH SHOULDERS: ICD-10-CM

## 2023-03-02 DIAGNOSIS — R07.89 STERNAL PAIN: ICD-10-CM

## 2023-03-02 DIAGNOSIS — R07.89 CHEST PRESSURE: Primary | ICD-10-CM

## 2023-03-02 DIAGNOSIS — M25.512 ACUTE PAIN OF BOTH SHOULDERS: ICD-10-CM

## 2023-03-02 LAB
ERYTHROCYTE [DISTWIDTH] IN BLOOD BY AUTOMATED COUNT: 12.8 % (ref 10–15)
HCT VFR BLD AUTO: 45.3 % (ref 40–53)
HGB BLD-MCNC: 15.1 G/DL (ref 13.3–17.7)
MCH RBC QN AUTO: 30 PG (ref 26.5–33)
MCHC RBC AUTO-ENTMCNC: 33.3 G/DL (ref 31.5–36.5)
MCV RBC AUTO: 90 FL (ref 78–100)
PLATELET # BLD AUTO: 296 10E3/UL (ref 150–450)
RBC # BLD AUTO: 5.04 10E6/UL (ref 4.4–5.9)
WBC # BLD AUTO: 7.4 10E3/UL (ref 4–11)

## 2023-03-02 PROCEDURE — 36415 COLL VENOUS BLD VENIPUNCTURE: CPT

## 2023-03-02 PROCEDURE — 99214 OFFICE O/P EST MOD 30 MIN: CPT

## 2023-03-02 PROCEDURE — 80053 COMPREHEN METABOLIC PANEL: CPT

## 2023-03-02 PROCEDURE — 93000 ELECTROCARDIOGRAM COMPLETE: CPT

## 2023-03-02 PROCEDURE — 85027 COMPLETE CBC AUTOMATED: CPT

## 2023-03-02 NOTE — PROGRESS NOTES
"  Assessment & Plan     (R07.89) Chest pressure  (primary encounter diagnosis)  (R07.89) Sternal pain  Comment: Considering costochondritis, acid reflux, pericarditis. EKG to help rule in/out cardiac concerns, EKG normal sinus bradycardia. No T wave abnormalities or st segment changes. CMP and CBC given pain in shoulders to rule out other organ etiology. Patient exam during visit predominantly negative. Unable to elicit pain with deep palpation along sternum, chest cavity, back, shoulders, or ribs. Given pain seems to worsen with laying down would like to rule in/out acid reflux. suggested to patient that he could try OTC H 2 blocker such as Pepcid to start. If continues to have sternal pain could try ibuprofen to rule out musculoskeletal etiology. Did discuss stress echo, but at this time with lack of evidence on EKG and no evident PMH risk factors and familial genetics of heart disease will defer at this time. Nonetheless did review red flag cardiac symptoms and when to present to the ER if necessary.  Patient agreeable with plan of care and at this point patient will follow up in 3 weeks unless he feels necessary to be seen sooner.   Plan: EKG 12-lead complete w/read - Clinics, CBC with        platelets, Comprehensive metabolic panel (BMP +        Alb, Alk Phos, ALT, AST, Total. Bili, TP),         PRIMARY CARE FOLLOW-UP SCHEDULING    (M25.511,  M25.512) Acute pain of both shoulders  Comment: labs indicated as above.   Plan: CBC with platelets, Comprehensive metabolic         panel (BMP + Alb, Alk Phos, ALT, AST, Total.         Bili, TP)    32 minutes spent on the date of the encounter doing chart review, history and exam, documentation and further activities per the note       BMI:   Estimated body mass index is 32.26 kg/m  as calculated from the following:    Height as of this encounter: 1.702 m (5' 7\").    Weight as of this encounter: 93.4 kg (206 lb).   Weight management plan: Discussed healthy diet and " exercise guidelines      No follow-ups on file.   Follow-up Visit   Expected date: Mar 23, 2023      Follow Up Appointment Details:     Follow-up with whom?: Me    Follow-Up for what?: Acute Issue Recheck    Additional Details: chest pressure    How?: In Person or Virtual                    YIFAN Chu CNP [facility] RADHA Quezada is a 37 year old, presenting for the following health issues:  Back Pain and Chest Pain      History of Present Illness       Back Pain:  He presents for follow up of back pain. Patient's back pain is a recurring problem.  Location of back pain:  Right middle of back, left middle of back, right buttock and left buttock  Description of back pain: dull ache, fullness, shooting and stabbing  Back pain spreads: right buttocks, left buttocks, right thigh and left thigh    Since patient first noticed back pain, pain is: always present, but gets better and worse  Does back pain interfere with his job:  No      Reason for visit:  Sternum/chest pain  Symptom onset:  More than a month  Symptoms include:  Feels like my heart is being squeezed  Symptom intensity:  Moderate  Symptom progression:  Staying the same  Had these symptoms before:  No  What makes it worse:  When sleeping the pain gets severe at times  What makes it better:  Notice pain least when doing cardio exercise    He eats 4 or more servings of fruits and vegetables daily.He consumes 0 sweetened beverage(s) daily.He exercises with enough effort to increase his heart rate 60 or more minutes per day.  He exercises with enough effort to increase his heart rate 6 days per week.   He is taking medications regularly.     Sternal discomfort worst in sleep, tends to be worst when laying down at nighttime.  Notices it the least when exercising hard   Sternal discomfort more around xyphoid process  Seems to improve with movement  Pain wraps around into thoracic back, does have LBP but his is mild and  "manageable not really the issue  Has not taken any thing for it   Has tried to be eating healthier, eating more salads with tomatoes  Cutting back on high fatty and fried foods, trying to lose weight.  No recent illness  No trauma to chest      Review of Systems   Constitutional, HEENT, cardiovascular, pulmonary, GI, , musculoskeletal, neuro, skin, endocrine and psych systems are negative, except as otherwise noted.      Objective    /71 (BP Location: Right arm, Patient Position: Sitting, Cuff Size: Adult Regular)   Pulse 66   Temp 97.9  F (36.6  C) (Oral)   Resp 16   Ht 1.702 m (5' 7\")   Wt 93.4 kg (206 lb)   SpO2 97%   BMI 32.26 kg/m    Body mass index is 32.26 kg/m .  Physical Exam  Vitals and nursing note reviewed.   Constitutional:       General: He is not in acute distress.     Appearance: Normal appearance. He is obese. He is not ill-appearing.   Cardiovascular:      Rate and Rhythm: Normal rate and regular rhythm.      Heart sounds: No murmur heard.    No friction rub. No gallop.   Pulmonary:      Effort: Pulmonary effort is normal. No respiratory distress.      Breath sounds: No wheezing, rhonchi or rales.   Chest:      Chest wall: No swelling, tenderness or crepitus.   Abdominal:      General: Bowel sounds are normal. There is no distension.      Palpations: Abdomen is soft. There is no mass.      Tenderness: There is no abdominal tenderness. There is no guarding or rebound.      Hernia: No hernia is present.   Musculoskeletal:      Right shoulder: Normal.      Left shoulder: Normal.      Thoracic back: Normal.   Skin:     General: Skin is warm and dry.   Neurological:      Mental Status: He is alert.   Psychiatric:         Mood and Affect: Mood normal.         Behavior: Behavior normal. Behavior is cooperative.         Thought Content: Thought content normal.         Judgment: Judgment normal.                 "

## 2023-03-03 LAB
ALBUMIN SERPL BCG-MCNC: 4.6 G/DL (ref 3.5–5.2)
ALP SERPL-CCNC: 61 U/L (ref 40–129)
ALT SERPL W P-5'-P-CCNC: 20 U/L (ref 10–50)
ANION GAP SERPL CALCULATED.3IONS-SCNC: 14 MMOL/L (ref 7–15)
AST SERPL W P-5'-P-CCNC: 26 U/L (ref 10–50)
BILIRUB SERPL-MCNC: 0.6 MG/DL
BUN SERPL-MCNC: 20.2 MG/DL (ref 6–20)
CALCIUM SERPL-MCNC: 9.1 MG/DL (ref 8.6–10)
CHLORIDE SERPL-SCNC: 102 MMOL/L (ref 98–107)
CREAT SERPL-MCNC: 0.95 MG/DL (ref 0.67–1.17)
DEPRECATED HCO3 PLAS-SCNC: 23 MMOL/L (ref 22–29)
GFR SERPL CREATININE-BSD FRML MDRD: >90 ML/MIN/1.73M2
GLUCOSE SERPL-MCNC: 87 MG/DL (ref 70–99)
POTASSIUM SERPL-SCNC: 4.4 MMOL/L (ref 3.4–5.3)
PROT SERPL-MCNC: 7.3 G/DL (ref 6.4–8.3)
SODIUM SERPL-SCNC: 139 MMOL/L (ref 136–145)

## 2023-08-12 ENCOUNTER — HEALTH MAINTENANCE LETTER (OUTPATIENT)
Age: 38
End: 2023-08-12

## 2024-10-05 ENCOUNTER — HEALTH MAINTENANCE LETTER (OUTPATIENT)
Age: 39
End: 2024-10-05